# Patient Record
Sex: FEMALE | Race: BLACK OR AFRICAN AMERICAN | NOT HISPANIC OR LATINO | URBAN - METROPOLITAN AREA
[De-identification: names, ages, dates, MRNs, and addresses within clinical notes are randomized per-mention and may not be internally consistent; named-entity substitution may affect disease eponyms.]

---

## 2023-02-24 ENCOUNTER — OFFICE (OUTPATIENT)
Dept: URBAN - METROPOLITAN AREA CLINIC 12 | Facility: CLINIC | Age: 68
End: 2023-02-24
Payer: MEDICAID

## 2023-02-24 VITALS
WEIGHT: 99 LBS | HEART RATE: 70 BPM | OXYGEN SATURATION: 96 % | HEIGHT: 59 IN | SYSTOLIC BLOOD PRESSURE: 94 MMHG | DIASTOLIC BLOOD PRESSURE: 55 MMHG

## 2023-02-24 DIAGNOSIS — K21.9 GASTRO-ESOPHAGEAL REFLUX DISEASE WITHOUT ESOPHAGITIS: ICD-10-CM

## 2023-02-24 DIAGNOSIS — R11.0 NAUSEA: ICD-10-CM

## 2023-02-24 DIAGNOSIS — R10.9 UNSPECIFIED ABDOMINAL PAIN: ICD-10-CM

## 2023-02-24 DIAGNOSIS — K58.9 IRRITABLE BOWEL SYNDROME WITHOUT DIARRHEA: ICD-10-CM

## 2023-02-24 DIAGNOSIS — R17 UNSPECIFIED JAUNDICE: ICD-10-CM

## 2023-02-24 DIAGNOSIS — R93.3 ABNORMAL FINDINGS ON DIAGNOSTIC IMAGING OF OTHER PARTS OF DI: ICD-10-CM

## 2023-02-24 PROCEDURE — 99204 OFFICE O/P NEW MOD 45 MIN: CPT | Performed by: NURSE PRACTITIONER

## 2023-02-24 RX ORDER — PANTOPRAZOLE 40 MG/1
40 TABLET, DELAYED RELEASE ORAL
Qty: 90 | Refills: 2 | Status: COMPLETED
Start: 2023-02-24 | End: 2024-02-23

## 2023-02-24 NOTE — SERVICEHPINOTES
Ms. Thurston is a 67-year-old female referred for abdominal pain, heartburn, nausea. She has been seen in the past by Dr. Gtz for GERD and had a negative gastric emptying study. She was seen at [facility] [place] on 11/08/2022 and a CT scan showed  nonspecific gallbladder wall thickening.  She had blood work at her PCP on 02/10/2023 and her total bilirubin was 2.1.  Her LFTs were normal. She reports lower abdominal pain most days of the week that last for 2-3 hours then resolves.  This is been ongoing for quite some time.  She does report abdominal pain after eating   And nausea.  She has pretty regular bowel movements but does have some occasional constipation.  She denies any vomiting, dysphagia, change in bowel habits, hematochezia, or melena. She had a colonoscopy by Dr. MADHAV Michael on 12/23/20  that showed multiple tubular adenomas.

## 2023-02-24 NOTE — SERVICENOTES
has been a patient of Dr. Samuel Michael  but he no longer takes her insurance.  She has a history of IBS which could be contributing to her abdominal pain.   CT scan did show gallbladder wall thickening so we will get HIDA scan to further evaluate.   Will get a breath test to check for H pylori in 2 weeks and get a upper GI as well.   She has some reflux and nausea so will start on pantoprazole  in 2 weeks after breath test..   She will start on Citrucel daily and use MiraLax as needed.  she had an elevated bilirubin recently.  We will check labs today.  We will see her back in 3 months or sooner.

## 2023-02-25 LAB
HEPATIC FUNCTION PANEL (7): ALBUMIN: 4.5 G/DL (ref 3.8–4.8)
HEPATIC FUNCTION PANEL (7): ALKALINE PHOSPHATASE: 135 IU/L — HIGH (ref 44–121)
HEPATIC FUNCTION PANEL (7): ALT (SGPT): 12 IU/L (ref 0–32)
HEPATIC FUNCTION PANEL (7): AST (SGOT): 30 IU/L (ref 0–40)
HEPATIC FUNCTION PANEL (7): BILIRUBIN, DIRECT: 0.81 MG/DL — HIGH (ref 0–0.4)
HEPATIC FUNCTION PANEL (7): BILIRUBIN, TOTAL: 1.4 MG/DL — HIGH (ref 0–1.2)
HEPATIC FUNCTION PANEL (7): PROTEIN, TOTAL: 7.4 G/DL (ref 6–8.5)

## 2023-03-15 LAB
H PYLORI BREATH TEST: NEGATIVE
H. PYLORI BREATH COLLECTION: (no result)

## 2023-05-26 ENCOUNTER — OFFICE (OUTPATIENT)
Dept: URBAN - METROPOLITAN AREA CLINIC 12 | Facility: CLINIC | Age: 68
End: 2023-05-26
Payer: MEDICAID

## 2023-05-26 VITALS
OXYGEN SATURATION: 98 % | HEART RATE: 75 BPM | HEIGHT: 59 IN | DIASTOLIC BLOOD PRESSURE: 65 MMHG | SYSTOLIC BLOOD PRESSURE: 106 MMHG | WEIGHT: 99 LBS

## 2023-05-26 DIAGNOSIS — K21.9 GASTRO-ESOPHAGEAL REFLUX DISEASE WITHOUT ESOPHAGITIS: ICD-10-CM

## 2023-05-26 DIAGNOSIS — K58.9 IRRITABLE BOWEL SYNDROME WITHOUT DIARRHEA: ICD-10-CM

## 2023-05-26 DIAGNOSIS — R10.9 UNSPECIFIED ABDOMINAL PAIN: ICD-10-CM

## 2023-05-26 PROCEDURE — 99214 OFFICE O/P EST MOD 30 MIN: CPT | Performed by: NURSE PRACTITIONER

## 2023-05-26 RX ORDER — PANTOPRAZOLE 40 MG/1
40 TABLET, DELAYED RELEASE ORAL
Qty: 90 | Refills: 2 | Status: COMPLETED
Start: 2023-02-24 | End: 2024-02-23

## 2023-05-26 RX ORDER — DICYCLOMINE HYDROCHLORIDE 20 MG/2ML
10 INJECTION, SOLUTION INTRAMUSCULAR
Refills: 0 | Status: COMPLETED
End: 2023-05-26

## 2023-05-26 RX ORDER — DICYCLOMINE HYDROCHLORIDE 10 MG/1
CAPSULE ORAL
Qty: 30 | Refills: 3 | Status: ACTIVE
Start: 2023-05-26

## 2023-05-26 RX ORDER — SUCRALFATE ORAL 1 G/10ML
SUSPENSION ORAL
Qty: 1200 | Refills: 1 | Status: COMPLETED
Start: 2023-05-26 | End: 2024-02-23

## 2023-05-26 NOTE — SERVICEHPINOTES
Ms. Thurston is a 67-year-old female referred for follow up of abdominal pain, heartburn, nausea. At her first visit, she was seen at [facility] [place] for abdominal pain on 11/08/2022 and a CT scan showed  nonspecific gallbladder wall thickening.  She had lower abdominal pain most days of the week that last for 2-3 hours then resolve. She had abdominal pain and nausea after eating. SHe had an Upper GI on 3/2 that showed severe reflux and was started on pantoprazole. She had a normal Hida scan and MRI of the abdomen. She had a negative h. pylori breath test as well. In follow up today,   She continues to have heartburn, reflux, nausea but never got the pantoprazole that was prescribed in February.  She still has lower abdominal pain and is using dicyclomine twice a day.  Her bowel movements are pretty normal and she denies any change in bowel habits or blood in stool.

## 2023-05-26 NOTE — SERVICENOTES
She never started pantoprazole that was sent to Pharmacy in February.   She continues to have symptoms.  Will start her on pantoprazole and sucralfate.   Will consider EGD if she continues to have symptoms on medication at her follow-up.  Will increase dicyclomine to every 6 hours.  Last colonoscopy was December 2020 and she states she gets them every 3 years.   She will follow up with Dr. Hogue and 3 months as she would like to stay at the EM office

## 2023-09-01 ENCOUNTER — OFFICE (OUTPATIENT)
Dept: URBAN - METROPOLITAN AREA CLINIC 12 | Facility: CLINIC | Age: 68
End: 2023-09-01
Payer: MEDICAID

## 2023-09-01 VITALS
HEART RATE: 77 BPM | WEIGHT: 99 LBS | SYSTOLIC BLOOD PRESSURE: 126 MMHG | HEIGHT: 59 IN | OXYGEN SATURATION: 97 % | DIASTOLIC BLOOD PRESSURE: 70 MMHG

## 2023-09-01 DIAGNOSIS — R10.84 GENERALIZED ABDOMINAL PAIN: ICD-10-CM

## 2023-09-01 DIAGNOSIS — K21.9 GASTRO-ESOPHAGEAL REFLUX DISEASE WITHOUT ESOPHAGITIS: ICD-10-CM

## 2023-09-01 DIAGNOSIS — K58.9 IRRITABLE BOWEL SYNDROME WITHOUT DIARRHEA: ICD-10-CM

## 2023-09-01 DIAGNOSIS — K92.1 MELENA: ICD-10-CM

## 2023-09-01 PROCEDURE — 99214 OFFICE O/P EST MOD 30 MIN: CPT | Performed by: INTERNAL MEDICINE

## 2023-09-01 RX ORDER — PANTOPRAZOLE 40 MG/1
40 TABLET, DELAYED RELEASE ORAL
Qty: 90 | Refills: 2 | Status: COMPLETED
Start: 2023-02-24 | End: 2024-02-23

## 2023-09-12 LAB
LACTOFERRIN, FECAL, QUANT.: <1 UG/ML(G)
PANCREATIC ELASTASE, FECAL: 498 UG ELAST./G (ref 200–?)

## 2023-10-30 ENCOUNTER — ON CAMPUS - OUTPATIENT (OUTPATIENT)
Dept: URBAN - METROPOLITAN AREA HOSPITAL 131 | Facility: HOSPITAL | Age: 68
End: 2023-10-30
Payer: MEDICAID

## 2023-10-30 DIAGNOSIS — D12.4 BENIGN NEOPLASM OF DESCENDING COLON: ICD-10-CM

## 2023-10-30 DIAGNOSIS — K64.0 FIRST DEGREE HEMORRHOIDS: ICD-10-CM

## 2023-10-30 DIAGNOSIS — K63.89 OTHER SPECIFIED DISEASES OF INTESTINE: ICD-10-CM

## 2023-10-30 PROCEDURE — 45380 COLONOSCOPY AND BIOPSY: CPT | Performed by: INTERNAL MEDICINE

## 2024-02-23 ENCOUNTER — OFFICE (OUTPATIENT)
Dept: URBAN - METROPOLITAN AREA CLINIC 12 | Facility: CLINIC | Age: 69
End: 2024-02-23

## 2024-02-23 VITALS
SYSTOLIC BLOOD PRESSURE: 106 MMHG | DIASTOLIC BLOOD PRESSURE: 66 MMHG | WEIGHT: 100 LBS | HEIGHT: 59 IN | HEART RATE: 84 BPM | OXYGEN SATURATION: 97 %

## 2024-02-23 DIAGNOSIS — K21.9 GASTRO-ESOPHAGEAL REFLUX DISEASE WITHOUT ESOPHAGITIS: ICD-10-CM

## 2024-02-23 DIAGNOSIS — R10.84 GENERALIZED ABDOMINAL PAIN: ICD-10-CM

## 2024-02-23 DIAGNOSIS — R11.0 NAUSEA: ICD-10-CM

## 2024-02-23 DIAGNOSIS — K58.9 IRRITABLE BOWEL SYNDROME WITHOUT DIARRHEA: ICD-10-CM

## 2024-02-23 PROCEDURE — 99214 OFFICE O/P EST MOD 30 MIN: CPT | Performed by: INTERNAL MEDICINE

## 2024-02-23 RX ORDER — ONDANSETRON 4 MG/1
TABLET, ORALLY DISINTEGRATING ORAL
Qty: 30 | Refills: 6 | Status: ACTIVE
Start: 2024-02-23 | End: 2024-02-23

## 2024-02-23 RX ORDER — AMITRIPTYLINE HYDROCHLORIDE 10 MG/1
10 TABLET, FILM COATED ORAL
Qty: 30 | Refills: 4 | Status: ACTIVE
Start: 2024-02-23 | End: 2024-02-23

## 2024-03-31 ENCOUNTER — OFFICE (OUTPATIENT)
Dept: URBAN - METROPOLITAN AREA CLINIC 11 | Facility: CLINIC | Age: 69
End: 2024-03-31

## 2024-03-31 DIAGNOSIS — K58.9 IRRITABLE BOWEL SYNDROME WITHOUT DIARRHEA: ICD-10-CM

## 2024-03-31 DIAGNOSIS — K21.9 GASTRO-ESOPHAGEAL REFLUX DISEASE WITHOUT ESOPHAGITIS: ICD-10-CM

## 2024-03-31 PROCEDURE — 99490 CHRNC CARE MGMT STAFF 1ST 20: CPT | Performed by: INTERNAL MEDICINE

## 2024-04-12 ENCOUNTER — ON CAMPUS - OUTPATIENT (OUTPATIENT)
Dept: URBAN - METROPOLITAN AREA HOSPITAL 131 | Facility: HOSPITAL | Age: 69
End: 2024-04-12

## 2024-04-12 DIAGNOSIS — K64.8 OTHER HEMORRHOIDS: ICD-10-CM

## 2024-04-12 PROCEDURE — 45380 COLONOSCOPY AND BIOPSY: CPT | Performed by: INTERNAL MEDICINE

## 2024-04-30 ENCOUNTER — OFFICE (OUTPATIENT)
Dept: URBAN - METROPOLITAN AREA CLINIC 11 | Facility: CLINIC | Age: 69
End: 2024-04-30

## 2024-04-30 DIAGNOSIS — K21.9 GASTRO-ESOPHAGEAL REFLUX DISEASE WITHOUT ESOPHAGITIS: ICD-10-CM

## 2024-04-30 DIAGNOSIS — K58.9 IRRITABLE BOWEL SYNDROME WITHOUT DIARRHEA: ICD-10-CM

## 2024-04-30 PROCEDURE — 99490 CHRNC CARE MGMT STAFF 1ST 20: CPT | Performed by: INTERNAL MEDICINE

## 2024-06-04 ENCOUNTER — OFFICE (OUTPATIENT)
Dept: URBAN - METROPOLITAN AREA CLINIC 11 | Facility: CLINIC | Age: 69
End: 2024-06-04

## 2024-06-04 VITALS
SYSTOLIC BLOOD PRESSURE: 116 MMHG | HEART RATE: 63 BPM | OXYGEN SATURATION: 99 % | DIASTOLIC BLOOD PRESSURE: 65 MMHG | WEIGHT: 99 LBS | HEIGHT: 59 IN

## 2024-06-04 DIAGNOSIS — R11.0 NAUSEA: ICD-10-CM

## 2024-06-04 DIAGNOSIS — K21.9 GASTRO-ESOPHAGEAL REFLUX DISEASE WITHOUT ESOPHAGITIS: ICD-10-CM

## 2024-06-04 DIAGNOSIS — R10.84 GENERALIZED ABDOMINAL PAIN: ICD-10-CM

## 2024-06-04 DIAGNOSIS — K58.9 IRRITABLE BOWEL SYNDROME WITHOUT DIARRHEA: ICD-10-CM

## 2024-06-04 PROCEDURE — 99214 OFFICE O/P EST MOD 30 MIN: CPT | Performed by: NURSE PRACTITIONER

## 2024-06-04 RX ORDER — AMITRIPTYLINE HYDROCHLORIDE 10 MG/1
10 TABLET, FILM COATED ORAL
Qty: 30 | Refills: 4 | Status: ACTIVE
Start: 2024-02-23 | End: 2024-02-23

## 2024-06-04 RX ORDER — ONDANSETRON 4 MG/1
TABLET, ORALLY DISINTEGRATING ORAL
Qty: 30 | Refills: 6 | Status: ACTIVE
Start: 2024-02-23 | End: 2024-02-23

## 2024-06-04 NOTE — SERVICEHPINOTES
Ms. Thurston is a 68-year-old female that presents for follow up of abdominal pain, heartburn, nausea.  [place] [facility] [place] on 11[place]08/2022 and a CT scan showed  nonspecific gallbladder wall thickening.  She had lower abdominal pain most days of the week that last for 2-3 hours then resolve. She had abdominal pain and nausea after eating. SHe had an Upper GI on 3/2 that showed severe reflux and was started on pantoprazole. She had a normal Hida scan and MRI of the abdomen. She had a negative h. pylori breath test as well. In follow up today,   She continues to have heartburn, reflux, nausea but never got the pantoprazole that was prescribed in February.  She still has lower abdominal pain and is using dicyclomine twice a day.  Her bowel movements are pretty normal and she denies any change in bowel habits or blood in stool. 
tom santos In follow up on 2/23/24, At last visit in September 2023 she continued to have similar symptoms but also had noted some blood in her stool.  We therefore planned to proceed with colonoscopy.  Her additionally, her fecal lactoferrin and pancreatic elastase were normal.  She was initially set up for colonoscopy at the [facility] but clearance from her cardiologist was obtained but indicated that her area was reduced to the point that the procedure need to be done in the hospital.  We therefore performed her colonoscopy on 10/30 at [facility] where she had a poor prep and 2 small adenoma was removed.  A six-month recall was recommended due to the poor prep.  In follow-up today,  she says she has continued to have symptoms of abdominal pain.  She describes these as mostly lower abdominal pain.  They are relieved if she has a bowel movement but then returned later.  She describes alternating bowel habits with days of constipation and days of diarrhea.  Her weight is stable.  She has also noted worsening nausea at this now occurs more frequently, nearly every day.  Recently she was given a Zofran which she says significantly helped her symptoms but she does not have a prescription for this.
tom santos In follow up on 6/4/24,  she continue to have some issues with GERD and nausea at her last visit and had a normal gastric emptying study on 03/04/2024. She is tried omeprazole, pantoprazole, sucralfate in the past without any relief in her symptoms.  She was prescribed amitriptyline 10 mg at bedtime by Dr. Hogue her last visit but she was unaware of the prescription and never got the medication.   She continues to have heartburn, reflux, nausea.  She has abdominal pain and dicyclomine is not helping much.   She continued used to have altered bowel habits of loose stools and constipation.

## 2024-06-04 NOTE — SERVICENOTES
She has been very noncompliant with medication.  She never got the amitriptyline that was prescribed at her last visit.   I looked back for when I saw her in early 2023 and she did the same thing when I prescribed pantoprazole and sucralfate.  She did not start the medication until 3 months later when she saw Dr. Hogue.   Will recent medication to her pharmacy.  Discussed with her the possible side effects and discussed it could possibly make her drowsy and she needed take it before bedtime.  She verbalized understanding.  We will continue her Zofran as well.  Will see her back in few months to see how the amitriptyline is working. no

## 2024-06-30 ENCOUNTER — OFFICE (OUTPATIENT)
Dept: URBAN - METROPOLITAN AREA CLINIC 11 | Facility: CLINIC | Age: 69
End: 2024-06-30

## 2024-06-30 DIAGNOSIS — K21.9 GASTRO-ESOPHAGEAL REFLUX DISEASE WITHOUT ESOPHAGITIS: ICD-10-CM

## 2024-06-30 DIAGNOSIS — K58.9 IRRITABLE BOWEL SYNDROME WITHOUT DIARRHEA: ICD-10-CM

## 2024-06-30 PROCEDURE — 99490 CHRNC CARE MGMT STAFF 1ST 20: CPT | Performed by: INTERNAL MEDICINE

## 2024-06-30 PROCEDURE — 99439 CHRNC CARE MGMT STAF EA ADDL: CPT | Performed by: INTERNAL MEDICINE

## 2024-12-31 ENCOUNTER — OFFICE (OUTPATIENT)
Dept: URBAN - METROPOLITAN AREA CLINIC 11 | Facility: CLINIC | Age: 69
End: 2024-12-31
Payer: COMMERCIAL

## 2024-12-31 DIAGNOSIS — K58.9 IRRITABLE BOWEL SYNDROME, UNSPECIFIED: ICD-10-CM

## 2024-12-31 DIAGNOSIS — K21.9 GASTRO-ESOPHAGEAL REFLUX DISEASE WITHOUT ESOPHAGITIS: ICD-10-CM

## 2024-12-31 PROCEDURE — 99490 CHRNC CARE MGMT STAFF 1ST 20: CPT | Performed by: INTERNAL MEDICINE

## 2024-12-31 PROCEDURE — 99439 CHRNC CARE MGMT STAF EA ADDL: CPT | Performed by: INTERNAL MEDICINE

## 2025-06-30 ENCOUNTER — OFFICE (OUTPATIENT)
Dept: URBAN - METROPOLITAN AREA CLINIC 11 | Facility: CLINIC | Age: 70
End: 2025-06-30
Payer: COMMERCIAL

## 2025-06-30 DIAGNOSIS — K58.9 IRRITABLE BOWEL SYNDROME, UNSPECIFIED: ICD-10-CM

## 2025-06-30 DIAGNOSIS — K21.9 GASTRO-ESOPHAGEAL REFLUX DISEASE WITHOUT ESOPHAGITIS: ICD-10-CM

## 2025-06-30 PROCEDURE — 99490 CHRNC CARE MGMT STAFF 1ST 20: CPT | Performed by: INTERNAL MEDICINE
